# Patient Record
Sex: MALE | Race: WHITE | NOT HISPANIC OR LATINO | Employment: UNEMPLOYED | URBAN - METROPOLITAN AREA
[De-identification: names, ages, dates, MRNs, and addresses within clinical notes are randomized per-mention and may not be internally consistent; named-entity substitution may affect disease eponyms.]

---

## 2022-05-12 ENCOUNTER — HOSPITAL ENCOUNTER (INPATIENT)
Facility: HOSPITAL | Age: 67
LOS: 1 days | Discharge: HOME | DRG: 280 | End: 2022-05-13
Attending: EMERGENCY MEDICINE | Admitting: INTERNAL MEDICINE
Payer: COMMERCIAL

## 2022-05-12 ENCOUNTER — APPOINTMENT (EMERGENCY)
Dept: RADIOLOGY | Facility: HOSPITAL | Age: 67
DRG: 280 | End: 2022-05-12
Payer: COMMERCIAL

## 2022-05-12 DIAGNOSIS — N17.9 AKI (ACUTE KIDNEY INJURY) (HCC): ICD-10-CM

## 2022-05-12 DIAGNOSIS — I48.91 ATRIAL FIBRILLATION WITH RVR (HCC): ICD-10-CM

## 2022-05-12 DIAGNOSIS — I50.9 ACUTE ON CHRONIC CONGESTIVE HEART FAILURE, UNSPECIFIED HEART FAILURE TYPE (HCC): ICD-10-CM

## 2022-05-12 DIAGNOSIS — I48.91 ATRIAL FIBRILLATION WITH RAPID VENTRICULAR RESPONSE (HCC): Primary | ICD-10-CM

## 2022-05-12 PROBLEM — I10 HTN (HYPERTENSION): Status: ACTIVE | Noted: 2022-05-12

## 2022-05-12 PROBLEM — R79.1 ELEVATED INR: Status: ACTIVE | Noted: 2022-05-12

## 2022-05-12 LAB
2HR DELTA HS TROPONIN: 56 NG/L
4HR DELTA HS TROPONIN: 119 NG/L
ALBUMIN SERPL BCP-MCNC: 3.6 G/DL (ref 3.5–5)
ALP SERPL-CCNC: 107 U/L (ref 46–116)
ALT SERPL W P-5'-P-CCNC: 32 U/L (ref 12–78)
ANION GAP SERPL CALCULATED.3IONS-SCNC: 15 MMOL/L (ref 4–13)
APTT PPP: 55 SECONDS (ref 23–37)
AST SERPL W P-5'-P-CCNC: 27 U/L (ref 5–45)
BACTERIA UR QL AUTO: ABNORMAL /HPF
BASOPHILS # BLD AUTO: 0.05 THOUSANDS/ΜL (ref 0–0.1)
BASOPHILS NFR BLD AUTO: 0 % (ref 0–1)
BILIRUB SERPL-MCNC: 0.69 MG/DL (ref 0.2–1)
BILIRUB UR QL STRIP: ABNORMAL
BUN SERPL-MCNC: 18 MG/DL (ref 5–25)
CALCIUM SERPL-MCNC: 9.1 MG/DL (ref 8.3–10.1)
CARDIAC TROPONIN I PNL SERPL HS: 216 NG/L
CARDIAC TROPONIN I PNL SERPL HS: 272 NG/L
CARDIAC TROPONIN I PNL SERPL HS: 335 NG/L
CHLORIDE SERPL-SCNC: 99 MMOL/L (ref 100–108)
CLARITY UR: CLEAR
CO2 SERPL-SCNC: 26 MMOL/L (ref 21–32)
COLOR UR: YELLOW
CREAT SERPL-MCNC: 1.7 MG/DL (ref 0.6–1.3)
EOSINOPHIL # BLD AUTO: 0.01 THOUSAND/ΜL (ref 0–0.61)
EOSINOPHIL NFR BLD AUTO: 0 % (ref 0–6)
ERYTHROCYTE [DISTWIDTH] IN BLOOD BY AUTOMATED COUNT: 14.7 % (ref 11.6–15.1)
FLUAV RNA RESP QL NAA+PROBE: NEGATIVE
FLUBV RNA RESP QL NAA+PROBE: NEGATIVE
GFR SERPL CREATININE-BSD FRML MDRD: 40 ML/MIN/1.73SQ M
GLUCOSE SERPL-MCNC: 191 MG/DL (ref 65–140)
GLUCOSE UR STRIP-MCNC: NEGATIVE MG/DL
HCT VFR BLD AUTO: 48.5 % (ref 36.5–49.3)
HGB BLD-MCNC: 15.7 G/DL (ref 12–17)
HGB UR QL STRIP.AUTO: ABNORMAL
HYALINE CASTS #/AREA URNS LPF: ABNORMAL /LPF
IMM GRANULOCYTES # BLD AUTO: 0.12 THOUSAND/UL (ref 0–0.2)
IMM GRANULOCYTES NFR BLD AUTO: 1 % (ref 0–2)
INR PPP: 6.3 (ref 0.84–1.19)
KETONES UR STRIP-MCNC: ABNORMAL MG/DL
LEUKOCYTE ESTERASE UR QL STRIP: NEGATIVE
LYMPHOCYTES # BLD AUTO: 1.55 THOUSANDS/ΜL (ref 0.6–4.47)
LYMPHOCYTES NFR BLD AUTO: 10 % (ref 14–44)
MAGNESIUM SERPL-MCNC: 1.6 MG/DL (ref 1.6–2.6)
MCH RBC QN AUTO: 29.9 PG (ref 26.8–34.3)
MCHC RBC AUTO-ENTMCNC: 32.4 G/DL (ref 31.4–37.4)
MCV RBC AUTO: 92 FL (ref 82–98)
MONOCYTES # BLD AUTO: 0.53 THOUSAND/ΜL (ref 0.17–1.22)
MONOCYTES NFR BLD AUTO: 3 % (ref 4–12)
MUCOUS THREADS UR QL AUTO: ABNORMAL
NEUTROPHILS # BLD AUTO: 13.26 THOUSANDS/ΜL (ref 1.85–7.62)
NEUTS SEG NFR BLD AUTO: 86 % (ref 43–75)
NITRITE UR QL STRIP: NEGATIVE
NON-SQ EPI CELLS URNS QL MICRO: ABNORMAL /HPF
NRBC BLD AUTO-RTO: 1 /100 WBCS
OTHER STN SPEC: ABNORMAL
PH UR STRIP.AUTO: 5.5 [PH]
PLATELET # BLD AUTO: 558 THOUSANDS/UL (ref 149–390)
PMV BLD AUTO: 11.2 FL (ref 8.9–12.7)
POTASSIUM SERPL-SCNC: 4.5 MMOL/L (ref 3.5–5.3)
PROT SERPL-MCNC: 7.6 G/DL (ref 6.4–8.2)
PROT UR STRIP-MCNC: ABNORMAL MG/DL
PROTHROMBIN TIME: 53 SECONDS (ref 11.6–14.5)
RBC # BLD AUTO: 5.25 MILLION/UL (ref 3.88–5.62)
RBC #/AREA URNS AUTO: ABNORMAL /HPF
RSV RNA RESP QL NAA+PROBE: NEGATIVE
SARS-COV-2 RNA RESP QL NAA+PROBE: NEGATIVE
SODIUM SERPL-SCNC: 140 MMOL/L (ref 136–145)
SP GR UR STRIP.AUTO: >=1.03 (ref 1–1.03)
UROBILINOGEN UR QL STRIP.AUTO: 0.2 E.U./DL
WBC # BLD AUTO: 15.52 THOUSAND/UL (ref 4.31–10.16)
WBC #/AREA URNS AUTO: ABNORMAL /HPF

## 2022-05-12 PROCEDURE — 84484 ASSAY OF TROPONIN QUANT: CPT | Performed by: EMERGENCY MEDICINE

## 2022-05-12 PROCEDURE — 71250 CT THORAX DX C-: CPT

## 2022-05-12 PROCEDURE — 99223 1ST HOSP IP/OBS HIGH 75: CPT

## 2022-05-12 PROCEDURE — 0241U HB NFCT DS VIR RESP RNA 4 TRGT: CPT

## 2022-05-12 PROCEDURE — 96361 HYDRATE IV INFUSION ADD-ON: CPT

## 2022-05-12 PROCEDURE — 71045 X-RAY EXAM CHEST 1 VIEW: CPT

## 2022-05-12 PROCEDURE — 96376 TX/PRO/DX INJ SAME DRUG ADON: CPT

## 2022-05-12 PROCEDURE — 96365 THER/PROPH/DIAG IV INF INIT: CPT

## 2022-05-12 PROCEDURE — 36415 COLL VENOUS BLD VENIPUNCTURE: CPT | Performed by: EMERGENCY MEDICINE

## 2022-05-12 PROCEDURE — 85610 PROTHROMBIN TIME: CPT | Performed by: EMERGENCY MEDICINE

## 2022-05-12 PROCEDURE — 85730 THROMBOPLASTIN TIME PARTIAL: CPT | Performed by: EMERGENCY MEDICINE

## 2022-05-12 PROCEDURE — 96375 TX/PRO/DX INJ NEW DRUG ADDON: CPT

## 2022-05-12 PROCEDURE — 74176 CT ABD & PELVIS W/O CONTRAST: CPT

## 2022-05-12 PROCEDURE — 99291 CRITICAL CARE FIRST HOUR: CPT | Performed by: EMERGENCY MEDICINE

## 2022-05-12 PROCEDURE — 99285 EMERGENCY DEPT VISIT HI MDM: CPT

## 2022-05-12 PROCEDURE — 83735 ASSAY OF MAGNESIUM: CPT | Performed by: EMERGENCY MEDICINE

## 2022-05-12 PROCEDURE — 96366 THER/PROPH/DIAG IV INF ADDON: CPT

## 2022-05-12 PROCEDURE — 80053 COMPREHEN METABOLIC PANEL: CPT | Performed by: EMERGENCY MEDICINE

## 2022-05-12 PROCEDURE — 81001 URINALYSIS AUTO W/SCOPE: CPT

## 2022-05-12 PROCEDURE — G1004 CDSM NDSC: HCPCS

## 2022-05-12 PROCEDURE — 85025 COMPLETE CBC W/AUTO DIFF WBC: CPT | Performed by: EMERGENCY MEDICINE

## 2022-05-12 RX ORDER — WARFARIN SODIUM 2.5 MG/1
2.5 TABLET ORAL
COMMUNITY
End: 2022-05-13

## 2022-05-12 RX ORDER — ONDANSETRON 2 MG/ML
4 INJECTION INTRAMUSCULAR; INTRAVENOUS EVERY 6 HOURS PRN
Status: DISCONTINUED | OUTPATIENT
Start: 2022-05-12 | End: 2022-05-13 | Stop reason: HOSPADM

## 2022-05-12 RX ORDER — LISINOPRIL 5 MG/1
5 TABLET ORAL DAILY
COMMUNITY

## 2022-05-12 RX ORDER — ASPIRIN 81 MG/1
324 TABLET, CHEWABLE ORAL ONCE
Status: DISCONTINUED | OUTPATIENT
Start: 2022-05-12 | End: 2022-05-13 | Stop reason: HOSPADM

## 2022-05-12 RX ORDER — METOPROLOL TARTRATE 100 MG/1
100 TABLET ORAL EVERY 12 HOURS SCHEDULED
COMMUNITY

## 2022-05-12 RX ORDER — MAGNESIUM SULFATE HEPTAHYDRATE 40 MG/ML
2 INJECTION, SOLUTION INTRAVENOUS ONCE
Status: COMPLETED | OUTPATIENT
Start: 2022-05-12 | End: 2022-05-13

## 2022-05-12 RX ORDER — DILTIAZEM HYDROCHLORIDE 5 MG/ML
20 INJECTION INTRAVENOUS ONCE
Status: COMPLETED | OUTPATIENT
Start: 2022-05-12 | End: 2022-05-12

## 2022-05-12 RX ORDER — FUROSEMIDE 20 MG/1
20 TABLET ORAL 2 TIMES DAILY
Status: ON HOLD | COMMUNITY
End: 2022-05-13 | Stop reason: SDUPTHER

## 2022-05-12 RX ORDER — DIGOXIN 125 MCG
125 TABLET ORAL DAILY
Status: ON HOLD | COMMUNITY
End: 2022-05-13 | Stop reason: SDUPTHER

## 2022-05-12 RX ORDER — FUROSEMIDE 10 MG/ML
40 INJECTION INTRAMUSCULAR; INTRAVENOUS DAILY
Status: DISCONTINUED | OUTPATIENT
Start: 2022-05-12 | End: 2022-05-13

## 2022-05-12 RX ORDER — ACETAMINOPHEN 325 MG/1
650 TABLET ORAL EVERY 6 HOURS PRN
Status: DISCONTINUED | OUTPATIENT
Start: 2022-05-12 | End: 2022-05-13 | Stop reason: HOSPADM

## 2022-05-12 RX ORDER — ONDANSETRON 2 MG/ML
4 INJECTION INTRAMUSCULAR; INTRAVENOUS ONCE
Status: COMPLETED | OUTPATIENT
Start: 2022-05-12 | End: 2022-05-12

## 2022-05-12 RX ADMIN — SODIUM CHLORIDE 1000 ML: 0.9 INJECTION, SOLUTION INTRAVENOUS at 17:24

## 2022-05-12 RX ADMIN — DILTIAZEM HYDROCHLORIDE 20 MG: 5 INJECTION INTRAVENOUS at 17:24

## 2022-05-12 RX ADMIN — ONDANSETRON 4 MG: 2 INJECTION INTRAMUSCULAR; INTRAVENOUS at 21:17

## 2022-05-12 RX ADMIN — DILTIAZEM HYDROCHLORIDE 5 MG/HR: 5 INJECTION, SOLUTION INTRAVENOUS at 17:58

## 2022-05-12 RX ADMIN — ONDANSETRON 4 MG: 2 INJECTION INTRAMUSCULAR; INTRAVENOUS at 17:24

## 2022-05-12 RX ADMIN — MAGNESIUM SULFATE HEPTAHYDRATE 2 G: 40 INJECTION, SOLUTION INTRAVENOUS at 22:51

## 2022-05-12 RX ADMIN — FUROSEMIDE 40 MG: 10 INJECTION, SOLUTION INTRAMUSCULAR; INTRAVENOUS at 22:51

## 2022-05-12 NOTE — ED PROCEDURE NOTE
PROCEDURE  ECG 12 Lead Documentation Only    Date/Time: 5/12/2022 6:16 PM  Performed by: Lara Mcclellan DO  Authorized by: Lara Mcclellan DO     ECG reviewed by me, the ED Provider: yes    Patient location:  ED  Interpretation:     Interpretation: abnormal    Quality:     Tracing quality:  Limited by artifact  Rate:     ECG rate:  136    ECG rate assessment: tachycardic    Rhythm:     Rhythm: atrial fibrillation    Ectopy:     Ectopy: none    ST segments:     ST segments:  Normal  T waves:     T waves: normal    Q waves:     Q waves:  III         Lara Mcclellan DO  05/12/22 1816

## 2022-05-13 ENCOUNTER — APPOINTMENT (INPATIENT)
Dept: NON INVASIVE DIAGNOSTICS | Facility: HOSPITAL | Age: 67
DRG: 280 | End: 2022-05-13
Payer: COMMERCIAL

## 2022-05-13 VITALS
OXYGEN SATURATION: 93 % | DIASTOLIC BLOOD PRESSURE: 85 MMHG | SYSTOLIC BLOOD PRESSURE: 129 MMHG | HEART RATE: 75 BPM | HEIGHT: 68 IN | RESPIRATION RATE: 18 BRPM | WEIGHT: 174 LBS | TEMPERATURE: 99.2 F | BODY MASS INDEX: 26.37 KG/M2

## 2022-05-13 PROBLEM — R77.8 ELEVATED TROPONIN: Status: ACTIVE | Noted: 2022-05-13

## 2022-05-13 PROBLEM — D72.829 LEUKOCYTOSIS: Status: ACTIVE | Noted: 2022-05-13

## 2022-05-13 LAB
ANION GAP SERPL CALCULATED.3IONS-SCNC: 11 MMOL/L (ref 4–13)
AORTIC ROOT: 3.4 CM
APICAL FOUR CHAMBER EJECTION FRACTION: 35 %
APTT PPP: 51 SECONDS (ref 23–37)
BASOPHILS # BLD AUTO: 0.07 THOUSANDS/ΜL (ref 0–0.1)
BASOPHILS NFR BLD AUTO: 0 % (ref 0–1)
BUN SERPL-MCNC: 22 MG/DL (ref 5–25)
CALCIUM SERPL-MCNC: 8.3 MG/DL (ref 8.3–10.1)
CARDIAC TROPONIN I PNL SERPL HS: 299 NG/L (ref 8–18)
CHLORIDE SERPL-SCNC: 99 MMOL/L (ref 100–108)
CO2 SERPL-SCNC: 27 MMOL/L (ref 21–32)
CREAT SERPL-MCNC: 1.58 MG/DL (ref 0.6–1.3)
CREAT UR-MCNC: 94 MG/DL
DIGOXIN SERPL-MCNC: 1.2 NG/ML (ref 0.8–2)
EOSINOPHIL # BLD AUTO: 0.01 THOUSAND/ΜL (ref 0–0.61)
EOSINOPHIL NFR BLD AUTO: 0 % (ref 0–6)
ERYTHROCYTE [DISTWIDTH] IN BLOOD BY AUTOMATED COUNT: 14.8 % (ref 11.6–15.1)
FRACTIONAL SHORTENING: 22 (ref 28–44)
GFR SERPL CREATININE-BSD FRML MDRD: 44 ML/MIN/1.73SQ M
GLUCOSE SERPL-MCNC: 116 MG/DL (ref 65–140)
HCT VFR BLD AUTO: 40.8 % (ref 36.5–49.3)
HGB BLD-MCNC: 13.4 G/DL (ref 12–17)
IMM GRANULOCYTES # BLD AUTO: 0.05 THOUSAND/UL (ref 0–0.2)
IMM GRANULOCYTES NFR BLD AUTO: 0 % (ref 0–2)
INR PPP: 4.74 (ref 0.84–1.19)
INTERVENTRICULAR SEPTUM IN DIASTOLE (PARASTERNAL SHORT AXIS VIEW): 1.1 CM
INTERVENTRICULAR SEPTUM: 1.1 CM (ref 0.6–1.1)
LAAS-AP2: 16.4 CM2
LAAS-AP4: 18.4 CM2
LEFT ATRIUM SIZE: 4 CM
LEFT INTERNAL DIMENSION IN SYSTOLE: 3.9 CM (ref 2.1–4)
LEFT VENTRICLE DIASTOLIC VOLUME (MOD BIPLANE): 83 ML
LEFT VENTRICLE SYSTOLIC VOLUME (MOD BIPLANE): 43 ML
LEFT VENTRICULAR INTERNAL DIMENSION IN DIASTOLE: 5 CM (ref 3.5–6)
LEFT VENTRICULAR POSTERIOR WALL IN END DIASTOLE: 1.2 CM
LEFT VENTRICULAR STROKE VOLUME: 53 ML
LV EF: 48 %
LVSV (TEICH): 53 ML
LYMPHOCYTES # BLD AUTO: 3.11 THOUSANDS/ΜL (ref 0.6–4.47)
LYMPHOCYTES NFR BLD AUTO: 20 % (ref 14–44)
MCH RBC QN AUTO: 30.5 PG (ref 26.8–34.3)
MCHC RBC AUTO-ENTMCNC: 32.8 G/DL (ref 31.4–37.4)
MCV RBC AUTO: 93 FL (ref 82–98)
MONOCYTES # BLD AUTO: 1.33 THOUSAND/ΜL (ref 0.17–1.22)
MONOCYTES NFR BLD AUTO: 8 % (ref 4–12)
MV E'TISSUE VEL-SEP: 5 CM/S
MV STENOSIS PRESSURE HALF TIME: 42 MS
MV VALVE AREA P 1/2 METHOD: 5.24
NEUTROPHILS # BLD AUTO: 11.25 THOUSANDS/ΜL (ref 1.85–7.62)
NEUTS SEG NFR BLD AUTO: 72 % (ref 43–75)
NRBC BLD AUTO-RTO: 0 /100 WBCS
PLATELET # BLD AUTO: 413 THOUSANDS/UL (ref 149–390)
PMV BLD AUTO: 10.6 FL (ref 8.9–12.7)
POTASSIUM SERPL-SCNC: 4.2 MMOL/L (ref 3.5–5.3)
PROT UR-MCNC: 17 MG/DL
PROT/CREAT UR: 0.18 MG/G{CREAT} (ref 0–0.1)
PROTHROMBIN TIME: 42.7 SECONDS (ref 11.6–14.5)
RA PRESSURE ESTIMATED: 8 MMHG
RBC # BLD AUTO: 4.39 MILLION/UL (ref 3.88–5.62)
RIGHT ATRIUM AREA SYSTOLE A4C: 17.7 CM2
RIGHT VENTRICLE ID DIMENSION: 3.1 CM
RV PSP: 43 MMHG
SL CV LEFT ATRIUM LENGTH A2C: 4.7 CM
SL CV LV EF: 45
SL CV PED ECHO LEFT VENTRICLE DIASTOLIC VOLUME (MOD BIPLANE) 2D: 118 ML
SL CV PED ECHO LEFT VENTRICLE SYSTOLIC VOLUME (MOD BIPLANE) 2D: 64 ML
SODIUM SERPL-SCNC: 137 MMOL/L (ref 136–145)
TR MAX PG: 35 MMHG
TR PEAK VELOCITY: 3 M/S
TRICUSPID VALVE PEAK REGURGITATION VELOCITY: 2.95 M/S
TSH SERPL DL<=0.05 MIU/L-ACNC: 3.72 UIU/ML (ref 0.45–4.5)
WBC # BLD AUTO: 15.82 THOUSAND/UL (ref 4.31–10.16)

## 2022-05-13 PROCEDURE — 80162 ASSAY OF DIGOXIN TOTAL: CPT | Performed by: NURSE PRACTITIONER

## 2022-05-13 PROCEDURE — 93306 TTE W/DOPPLER COMPLETE: CPT | Performed by: INTERNAL MEDICINE

## 2022-05-13 PROCEDURE — 84156 ASSAY OF PROTEIN URINE: CPT | Performed by: NURSE PRACTITIONER

## 2022-05-13 PROCEDURE — 99239 HOSP IP/OBS DSCHRG MGMT >30: CPT | Performed by: INTERNAL MEDICINE

## 2022-05-13 PROCEDURE — 84443 ASSAY THYROID STIM HORMONE: CPT | Performed by: NURSE PRACTITIONER

## 2022-05-13 PROCEDURE — 80048 BASIC METABOLIC PNL TOTAL CA: CPT

## 2022-05-13 PROCEDURE — 85025 COMPLETE CBC W/AUTO DIFF WBC: CPT

## 2022-05-13 PROCEDURE — 85610 PROTHROMBIN TIME: CPT

## 2022-05-13 PROCEDURE — 85730 THROMBOPLASTIN TIME PARTIAL: CPT

## 2022-05-13 PROCEDURE — 82570 ASSAY OF URINE CREATININE: CPT | Performed by: NURSE PRACTITIONER

## 2022-05-13 PROCEDURE — 93306 TTE W/DOPPLER COMPLETE: CPT

## 2022-05-13 PROCEDURE — 99222 1ST HOSP IP/OBS MODERATE 55: CPT | Performed by: NURSE PRACTITIONER

## 2022-05-13 PROCEDURE — 84484 ASSAY OF TROPONIN QUANT: CPT | Performed by: NURSE PRACTITIONER

## 2022-05-13 RX ORDER — DIGOXIN 125 MCG
125 TABLET ORAL EVERY OTHER DAY
Refills: 0
Start: 2022-05-13

## 2022-05-13 RX ORDER — METOPROLOL TARTRATE 100 MG/1
100 TABLET ORAL EVERY 12 HOURS SCHEDULED
Status: DISCONTINUED | OUTPATIENT
Start: 2022-05-13 | End: 2022-05-13 | Stop reason: HOSPADM

## 2022-05-13 RX ORDER — FUROSEMIDE 20 MG/1
20 TABLET ORAL DAILY
Refills: 0
Start: 2022-05-13

## 2022-05-13 RX ADMIN — DILTIAZEM HYDROCHLORIDE 7.5 MG/HR: 5 INJECTION, SOLUTION INTRAVENOUS at 00:04

## 2022-05-13 RX ADMIN — FUROSEMIDE 40 MG: 10 INJECTION, SOLUTION INTRAMUSCULAR; INTRAVENOUS at 08:20

## 2022-05-13 RX ADMIN — METOPROLOL TARTRATE 100 MG: 100 TABLET, FILM COATED ORAL at 09:07

## 2022-05-13 NOTE — ASSESSMENT & PLAN NOTE
Troponin peaked at 355  Likely type 2 myocardial infarction in the setting of atrial fibrillation with rapid ventricular rate  Patient had  prior cardiac catheterization which showed no CAD

## 2022-05-13 NOTE — ASSESSMENT & PLAN NOTE
Pt INR noted to be 6 3 on warfarin at home  · Continue to hold Coumadin  · INR improved to 4 7  · Hold Coumadin for the next 2 days with repeat PT/INR on May 16, 2022

## 2022-05-13 NOTE — DISCHARGE SUMMARY
[facility]  Discharge- Aye Mcgill 1955, 74 y o  male MRN: [id number]  Unit/Bed#: [address] [place] [address] Encounter: [id number]  Primary Care Provider: No primary care provider on file  Date and time admitted to hospital: 2022  4:50 PM    * Atrial fibrillation with RVR [facility])  Assessment & Plan  Patient began with racing heart, vomiting beginning today  · Dx January at [place], on warfarin (unsure of dose, unable to verify)  · Per patient found to have thrombus on NATHALY, cardiac cath without extensive stenosis  · Nursing to obtain records  · Patient was given 20 milligram Cardizem bolus and was started on Cardizem drip and heart rate has improved  · Cardizem drip was discontinued   · Continue Lopressor 100 milligram p o  B i d   · INR was supratherapeutic for which Coumadin has been on hold  · Patient has a follow-up appointment with EP next week to discuss NATHALY with cardioversion  · Patient was discharged home as patient has  to attend tomorrow  · 2D echo showed slightly reduced systolic function with EF of 45% with mild-to-moderate MR and mild TR    Elevated troponin  Assessment & Plan  Troponin peaked at 355  Likely type 2 myocardial infarction in the setting of atrial fibrillation with rapid ventricular rate  Patient had  prior cardiac catheterization which showed no CAD    Acute on chronic congestive heart failure ([address] )  Assessment & Plan  Wt Readings from Last 3 Encounters:   22 78 9 kg (174 lb)   Pt with increasing SOB on 2L oxygen, orthopnea  · Patient on lasix 20mg at home, unable to verify dose with pharmacy  · CT scan shows bilateral symmetric perihilar groundglass opacities and small bilateral effusions likely on the basis of pulmonary edema/CHF  Patient was given Lasix 40 milligrams  IV during hospitalization  No clinical evidence of significant fluid overload as patient is able to lay flat without any symptoms  Lasix dose was decreased to 20 milligram p o   Daily as per Cardiology recommendations          Leukocytosis  Assessment & Plan  No obvious signs of infection  Follow-up CBC in 1 week    KEILA (acute kidney injury) ([facility] [address] )  Assessment & Plan  Creatinine elevated to 1 7, unknown baseline  Creatinine improved  Resume lisinopril upon discharge      HTN (hypertension)  Assessment & Plan  Resume lisinopril    Elevated INR  Assessment & Plan  Pt INR noted to be 6 3 on warfarin at home  · Continue to hold Coumadin  · INR improved to 4 7  · Hold Coumadin for the next 2 days with repeat PT/INR on May 16, 2022      Medical Problems             Resolved Problems  Date Reviewed: 5/13/2022   None               Discharging Physician / Practitioner: Daryl Gomez MD  PCP: No primary care provider on file  Admission Date:   Admission Orders (From admission, onward)     Ordered        05/12/22 1940  INPATIENT ADMISSION  Once                      Discharge Date: 05/13/22    Consultations During Hospital Stay:  · Cardiology     Outpatient Tests Requested:  · Follow-up with Cardiology  CBC with differential and BMP in 1 week  PT/INR in 2 days    Complications:  None    Reason for Admission:  Racing heart    Hospital Course:   Magali Clemons is a 74 y o  male patient with past medical of CHF, atrial fibrillation, hypertension who originally presented to the hospital on 5/12/2022 due to in heart and vomiting  In the ED patient noted to be in atrial fibrillation with rapid ventricular rate and patient was started on Cardizem drip after receiving a bolus  Patient heart rate improved significantly and later Cardizem drip was discontinued  Patient also noted to have supratherapeutic INR for which Coumadin was on hold  Patient's troponin did peak at 355  But patient remained asymptomatic  Patient was seen by Cardiology and remained stable  Patient was transitioned back to metoprolol    Patient advised to hold Coumadin and advised to follow up outpatient with primary Cardiology and EP       Please see above list of diagnoses and related plan for additional information  Condition at Discharge: stable    Discharge Day Visit / Exam:   Subjective:  Patient denies any chest pain, shortness a breath, further palpitations, headache or dizziness  Vitals: Blood Pressure: 129/85 (05/13/22 1145)  Pulse: 75 (05/13/22 1145)  Temperature: 99 2 °F (37 3 °C) (05/13/22 1145)  Temp Source: Tympanic (05/12/22 1637)  Respirations: 18 (05/12/22 2358)  Height: 5' 8" (172 7 cm) (05/13/22 0747)  Weight - Scale: 78 9 kg (174 lb) (05/13/22 0747)  SpO2: 93 % (05/13/22 1145)  Exam:   Physical Exam  Constitutional:       Appearance: Normal appearance  HENT:      Head: Normocephalic and atraumatic  Eyes:      Extraocular Movements: Extraocular movements intact  Pupils: Pupils are equal, round, and reactive to light  Cardiovascular:      Rate and Rhythm: Normal rate  Rhythm irregular  Heart sounds: No murmur heard  No gallop  Pulmonary:      Effort: Pulmonary effort is normal       Breath sounds: Normal breath sounds  Abdominal:      General: Bowel sounds are normal       Palpations: Abdomen is soft  Tenderness: There is no abdominal tenderness  Musculoskeletal:         General: No swelling or deformity  Normal range of motion  Cervical back: Normal range of motion and neck supple  Skin:     General: Skin is warm and dry  Neurological:      General: No focal deficit present  Mental Status: He is alert  Discussion with Family: yes    Discharge instructions/Information to patient and family:   See after visit summary for information provided to patient and family  Provisions for Follow-Up Care:  See after visit summary for information related to follow-up care and any pertinent home health orders  Disposition:   Home    Planned Readmission: No     Discharge Statement:  I spent 35 minutes discharging the patient  This time was spent on the day of discharge   I had direct contact with the patient on the day of discharge  Greater than 50% of the total time was spent examining patient, answering all patient questions, arranging and discussing plan of care with patient as well as directly providing post-discharge instructions  Additional time then spent on discharge activities  Discharge Medications:  See after visit summary for reconciled discharge medications provided to patient and/or family        **Please Note: This note may have been constructed using a voice recognition system**

## 2022-05-13 NOTE — H&P
[facility]  H&P- Heather Bradshaw 1955, 74 y o  male MRN: [id number]  Unit/Bed#: [address] [place] [address] Encounter: [id number]  Primary Care Provider: No primary care provider on file  Date and time admitted to hospital: 5/12/2022  4:50 PM    * Atrial fibrillation with RVR [facility])  Assessment & Plan  Patient began with racing heart, vomiting beginning today  · Dx January at [place], on warfarin (unsure of dose, unable to verify)  · Per patient found to have thrombus on NATHALY, cardiac cath without extensive stenosis  · Nursing to obtain records  · Give cardizem 20mg bolus, cardizem drip  · Continue cardizem drip 12 5mg  · Optimize electrolytes  · Monitor on tele  · Cardiology consult    Acute on chronic congestive heart failure ([facility] )  Assessment & Plan  Wt Readings from Last 3 Encounters:   05/12/22 77 1 kg (170 lb)   Pt with increasing SOB on 2L oxygen, orthopnea  · Patient on lasix 20mg at home, unable to verify dose with pharmacy  · CT scan shows bilateral symmetric perihilar groundglass opacities and small bilateral effusions likely on the basis of pulmonary edema/CHF  · Start lasix 40mg IV daily  Daily weights, I/Os  Fluid, sodium restriction          KEILA (acute kidney injury) ([facility] [address] )  Assessment & Plan  Creatinine elevated to 1 7, unknown baseline  Avoid nephrotoxins, hold lisinopril  Monitor with BMP      HTN (hypertension)  Assessment & Plan  Hold lisinopril due to elevated creatinine    Elevated INR  Assessment & Plan  Pt INR noted to be 6 3 on warfarin at home  · Hold warfarin  · No signs of active bleeding  · Recheck INR/PTT in AM    VTE Pharmacologic Prophylaxis: VTE Score: 4 Moderate Risk (Score 3-4) - Pharmacological DVT Prophylaxis Contraindicated  Sequential Compression Devices Ordered  Code Status: Full code  Discussion with family: Patient declined call to        Anticipated Length of Stay: Patient will be admitted on an inpatient basis with an anticipated length of stay of greater than 2 midnights secondary to a fib RVR  Total Time for Visit, including Counseling / Coordination of Care: 45 minutes Greater than 50% of this total time spent on direct patient counseling and coordination of care  Chief Complaint: heart racing    History of Present Illness:  Zunilda Bardales is a 74 y o  male with a PMH of CHF, a fib, HTN who presents with a fib with RVR  He states that he was admitted to [facility] in January with COVID and found to be in a fib with RVR  He states he required ICU level care and has an extensive workup done  Per patient he had a NATHALY done which showed 2 thrombus in his heart and therefore couldn't get cardioverted  He states he was having chest pain at that time and had a cardiac cath done that showed no significant occlusion  He was started on digoxin, metoprolol, lisinopril, furosemide  Unable to verify dosages with pharmacy  He now presents with feeling like his heart is racing and vomiting beginning today  He denies any chest pain currently  He also notes non bloody vomiting beginning today  Also has been more SOB over the last few days and sleeps on an incline  Denies any bruising, nose bleeds, melena  Denies fevers, chills, cough, abdominal pain, constipation, diarrhea, dysuria, hematuria  Pt has also been feeling down recently as grandson recently passed away  He would like to attend  on Saturday if possible  Review of Systems:  Review of Systems   Constitutional: Negative for chills and fever  HENT: Negative  Eyes: Negative  Respiratory: Positive for shortness of breath  Cardiovascular: Positive for palpitations  Negative for chest pain  Gastrointestinal: Positive for vomiting  Negative for abdominal pain, constipation and diarrhea  Endocrine: Negative  Genitourinary: Negative for dysuria, hematuria and urgency  Musculoskeletal: Negative  Allergic/Immunologic: Negative      Neurological: Negative for dizziness, light-headedness and numbness  Hematological: Negative  Psychiatric/Behavioral: Negative  Past Medical and Surgical History:   Past Medical History:   Diagnosis Date    Atrial fibrillation ([address] )     Hypertension        Past Surgical History:   Procedure Laterality Date    JOINT REPLACEMENT Right     knee       Meds/Allergies:  Prior to Admission medications    Medication Sig Start Date End Date Taking? Authorizing Provider   digoxin (Digox) 0 125 mg tablet Take 125 mcg by mouth in the morning  Yes Historical Provider, MD   furosemide (LASIX) 20 mg tablet Take 20 mg by mouth in the morning and 20 mg in the evening  Yes Historical Provider, MD   lisinopril (ZESTRIL) 5 mg tablet Take 5 mg by mouth in the morning  Yes Historical Provider, MD   metoprolol tartrate (LOPRESSOR) 100 mg tablet Take 100 mg by mouth every 12 (twelve) hours   Yes Historical Provider, MD   warfarin (COUMADIN) 2 5 mg tablet Take 2 5 mg by mouth in the evening  *Pt unsure of dose*  Yes Historical Provider, MD     Reviewed home medications with patient, however unable to verify with pharamcy  Allergies: Allergies   Allergen Reactions    Codeine Rash       Social History:  Marital Status: Single   Occupation:   Patient Pre-hospital Living Situation: Home  Patient Pre-hospital Level of Mobility: walks  Patient Pre-hospital Diet Restrictions:   Substance Use History:   Social History     Substance and Sexual Activity   Alcohol Use Yes     Social History     Tobacco Use   Smoking Status Former Smoker   Smokeless Tobacco Not on file     Social History     Substance and Sexual Activity   Drug Use Never       Family History:  History reviewed  No pertinent family history      Physical Exam:     Vitals:   Blood Pressure: 161/93 (05/12/22 2100)  Pulse: 90 (05/12/22 2100)  Temperature: (!) 97 1 °F (36 2 °C) (05/12/22 1637)  Temp Source: Tympanic (05/12/22 1637)  Respirations: 20 (05/12/22 2100)  Height: 5' 8" (172 7 cm) (05/12/22 1759)  Weight - Scale: 77 1 kg (170 lb) (05/12/22 1759)  SpO2: 95 % (05/12/22 2100)    Physical Exam  Vitals reviewed  Constitutional:       General: He is not in acute distress  Appearance: He is well-developed  HENT:      Head: Normocephalic and atraumatic  Eyes:      Conjunctiva/sclera: Conjunctivae normal    Cardiovascular:      Rate and Rhythm: Tachycardia present  Rhythm irregular  Heart sounds: No murmur heard  Pulmonary:      Effort: Pulmonary effort is normal  No respiratory distress  Breath sounds: Rales present  Comments: 94% on 2L  Abdominal:      Palpations: Abdomen is soft  Tenderness: There is no abdominal tenderness  Skin:     General: Skin is warm and dry  Neurological:      Mental Status: He is alert  Additional Data:     Lab Results:  Results from last 7 days   Lab Units 05/12/22  1723   WBC Thousand/uL 15 52*   HEMOGLOBIN g/dL 15 7   HEMATOCRIT % 48 5   PLATELETS Thousands/uL 558*   NEUTROS PCT % 86*   LYMPHS PCT % 10*   MONOS PCT % 3*   EOS PCT % 0     Results from last 7 days   Lab Units 05/12/22  1723   SODIUM mmol/L 140   POTASSIUM mmol/L 4 5   CHLORIDE mmol/L 99*   CO2 mmol/L 26   BUN mg/dL 18   CREATININE mg/dL 1 70*   ANION GAP mmol/L 15*   CALCIUM mg/dL 9 1   ALBUMIN g/dL 3 6   TOTAL BILIRUBIN mg/dL 0 69   ALK PHOS U/L 107   ALT U/L 32   AST U/L 27   GLUCOSE RANDOM mg/dL 191*     Results from last 7 days   Lab Units 05/12/22  1808   INR  6 30*                   Imaging: Reviewed radiology reports from this admission including: abdominal/pelvic CT  CT chest abdomen pelvis wo contrast   Final Result by Faraz Lanza MD (05/12 1906)      Bilateral symmetric perihilar groundglass opacities and small bilateral effusions likely on the basis of pulmonary edema/CHF  No acute inflammatory process identified in the abdomen or pelvis  Colonic diverticulosis without diverticulitis                    Workstation performed: [id number]         XR chest 1 view portable    (Results Pending)       EKG and Other Studies Reviewed on Admission:   · EKG: Atrial fibrillation    ** Please Note: This note has been constructed using a voice recognition system   **

## 2022-05-13 NOTE — PLAN OF CARE
Problem: PAIN - ADULT  Goal: Verbalizes/displays adequate comfort level or baseline comfort level  Description: Interventions:  - Encourage patient to monitor pain and request assistance  - Assess pain using appropriate pain scale  - Administer analgesics based on type and severity of pain and evaluate response  - Implement non-pharmacological measures as appropriate and evaluate response  - Consider cultural and social influences on pain and pain management  - Notify physician/advanced practitioner if interventions unsuccessful or patient reports new pain  Outcome: Progressing     Problem: SAFETY ADULT  Goal: Patient will remain free of falls  Description: INTERVENTIONS:  - Educate patient/family on patient safety including physical limitations  - Instruct patient to call for assistance with activity   - Consult OT/PT to assist with strengthening/mobility   - Keep Call bell within reach  - Keep bed low and locked with side rails adjusted as appropriate  - Keep care items and personal belongings within reach  - Initiate and maintain comfort rounds  - Make Fall Risk Sign visible to staff  - Offer Toileting every 2 Hours, in advance of need  - Initiate/Maintain bed alarm    Problem: CARDIOVASCULAR - ADULT  Goal: Maintains optimal cardiac output and hemodynamic stability  Description: INTERVENTIONS:  - Monitor I/O, vital signs and rhythm  - Monitor for S/S and trends of decreased cardiac output  - Administer and titrate ordered vasoactive medications to optimize hemodynamic stability  - Assess quality of pulses, skin color and temperature  - Assess for signs of decreased coronary artery perfusion  - Instruct patient to report change in severity of symptoms  Outcome: Progressing  Goal: Absence of cardiac dysrhythmias or at baseline rhythm  Description: INTERVENTIONS:  - Continuous cardiac monitoring, vital signs, obtain 12 lead EKG if ordered  - Administer antiarrhythmic and heart rate control medications as ordered  - Monitor electrolytes and administer replacement therapy as ordered  Outcome: Progressing     Problem: RESPIRATORY - ADULT  Goal: Achieves optimal ventilation and oxygenation  Description: INTERVENTIONS:  - Assess for changes in respiratory status  - Assess for changes in mentation and behavior  - Position to facilitate oxygenation and minimize respiratory effort  - Oxygen administered by appropriate delivery if ordered  - Initiate smoking cessation education as indicated  - Encourage broncho-pulmonary hygiene including cough, deep breathe, Incentive Spirometry  - Assess the need for suctioning and aspirate as needed  - Assess and instruct to report SOB or any respiratory difficulty  - Respiratory Therapy support as indicated  Outcome: Progressing     - Apply yellow socks and bracelet for high fall risk patients  - Consider moving patient to room near nurses station  Outcome: Progressing

## 2022-05-13 NOTE — ASSESSMENT & PLAN NOTE
Wt Readings from Last 3 Encounters:   05/12/22 77 1 kg (170 lb)   Pt with increasing SOB on 2L oxygen, orthopnea  · Patient on lasix 20mg at home, unable to verify dose with pharmacy  · CT scan shows bilateral symmetric perihilar groundglass opacities and small bilateral effusions likely on the basis of pulmonary edema/CHF    · Start lasix 40mg IV daily  Daily weights, I/Os  Fluid, sodium restriction

## 2022-05-13 NOTE — PLAN OF CARE
Problem: PAIN - ADULT  Goal: Verbalizes/displays adequate comfort level or baseline comfort level  Description: Interventions:  - Encourage patient to monitor pain and request assistance  - Assess pain using appropriate pain scale  - Administer analgesics based on type and severity of pain and evaluate response  - Implement non-pharmacological measures as appropriate and evaluate response  - Consider cultural and social influences on pain and pain management  - Notify physician/advanced practitioner if interventions unsuccessful or patient reports new pain  5/13/2022 1348 by Michael Olivera RN  Outcome: Adequate for Discharge  5/13/2022 0943 by Michael Olivera RN  Outcome: Progressing     Problem: SAFETY ADULT  Goal: Patient will remain free of falls  Description: INTERVENTIONS:  - Educate patient/family on patient safety including physical limitations  - Instruct patient to call for assistance with activity   - Consult OT/PT to assist with strengthening/mobility   - Keep Call bell within reach  - Keep bed low and locked with side rails adjusted as appropriate  - Keep care items and personal belongings within reach  - Initiate and maintain comfort rounds  - Make Fall Risk Sign visible to staff  - Offer Toileting every 2 Hours, in advance of need  - Initiate/Maintain Bed alarm  - Obtain necessary fall risk management equipment: as required   - Apply yellow socks and bracelet for high fall risk patients  - Consider moving patient to room near nurses station  5/13/2022 1348 by Michael Olivera RN  Outcome: Adequate for Discharge  5/13/2022 0943 by Michael Olivera RN  Outcome: Progressing     Problem: CARDIOVASCULAR - ADULT  Goal: Maintains optimal cardiac output and hemodynamic stability  Description: INTERVENTIONS:  - Monitor I/O, vital signs and rhythm  - Monitor for S/S and trends of decreased cardiac output  - Administer and titrate ordered vasoactive medications to optimize hemodynamic stability  - Assess quality of pulses, skin color and temperature  - Assess for signs of decreased coronary artery perfusion  - Instruct patient to report change in severity of symptoms  5/13/2022 1348 by Yen Rosenthal RN  Outcome: Adequate for Discharge  5/13/2022 8886 by Yen Rosenthal RN  Outcome: Progressing  Goal: Absence of cardiac dysrhythmias or at baseline rhythm  Description: INTERVENTIONS:  - Continuous cardiac monitoring, vital signs, obtain 12 lead EKG if ordered  - Administer antiarrhythmic and heart rate control medications as ordered  - Monitor electrolytes and administer replacement therapy as ordered  5/13/2022 1348 by Yen Rosenthal RN  Outcome: Adequate for Discharge  5/13/2022 0943 by Yen Rosenthal RN  Outcome: Progressing     Problem: RESPIRATORY - ADULT  Goal: Achieves optimal ventilation and oxygenation  Description: INTERVENTIONS:  - Assess for changes in respiratory status  - Assess for changes in mentation and behavior  - Position to facilitate oxygenation and minimize respiratory effort  - Oxygen administered by appropriate delivery if ordered  - Initiate smoking cessation education as indicated  - Encourage broncho-pulmonary hygiene including cough, deep breathe, Incentive Spirometry  - Assess the need for suctioning and aspirate as needed  - Assess and instruct to report SOB or any respiratory difficulty  - Respiratory Therapy support as indicated  5/13/2022 1348 by Yen Rosenthal RN  Outcome: Adequate for Discharge  5/13/2022 0943 by Yen Rosenthal RN  Outcome: Progressing     Problem: Potential for Falls  Goal: Patient will remain free of falls  Description: INTERVENTIONS:  - Educate patient/family on patient safety including physical limitations  - Instruct patient to call for assistance with activity   - Consult OT/PT to assist with strengthening/mobility   - Keep Call bell within reach  - Keep bed low and locked with side rails adjusted as appropriate  - Keep care items and personal belongings within reach  - Initiate and maintain comfort rounds  - Make Fall Risk Sign visible to staff  - Offer Toileting every 2 Hours, in advance of need  - Initiate/Maintain Bed alarm  - Obtain necessary fall risk management equipment: as required   - Apply yellow socks and bracelet for high fall risk patients  - Consider moving patient to room near nurses station  5/13/2022 1348 by Autry Carrel, RN  Outcome: Adequate for Discharge  5/13/2022 0943 by Autry Carrel, RN  Outcome: Progressing

## 2022-05-13 NOTE — CASE MANAGEMENT
Case Management Assessment & Discharge Planning Note    Patient name Cabrera Sheridan  Location [address] [facility] 407/4 [place] 407-* MRN [id number]  : 1955 Date 2022       Current Admission Date: 2022  Current Admission Diagnosis:Atrial fibrillation with RVR [facility])   Patient Active Problem List    Diagnosis Date Noted    Atrial fibrillation with RVR ([facility] ) 2022    Acute on chronic congestive heart failure ([facility] ) 2022    Elevated INR 2022    HTN (hypertension) 2022    KEILA (acute kidney injury) ([facility] ) 2022      LOS (days): 1  Geometric Mean LOS (GMLOS) (days): 2 40  Days to GMLOS:1 7     OBJECTIVE:  Risk of Unplanned Readmission Score: 9 39   Current admission status: Inpatient    Preferred Pharmacy: No Pharmacies Listed  Primary Care Provider: No primary care provider on file  Primary Insurance: [employer]  Secondary Insurance:     DISCHARGE DETAILS:    Chart reviewed and case discussed during MDR's  Patient is being dc'd and no needs identified

## 2022-05-13 NOTE — ASSESSMENT & PLAN NOTE
Patient began with racing heart, vomiting beginning today  · Dx January at [place], on warfarin (unsure of dose, unable to verify)  · Per patient found to have thrombus on NATHALY, cardiac cath without extensive stenosis  · Nursing to obtain records  · Patient was given 20 milligram Cardizem bolus and was started on Cardizem drip and heart rate has improved    · Cardizem drip was discontinued   · Continue Lopressor 100 milligram p o  B i d   · INR was supratherapeutic for which Coumadin has been on hold  · Patient has a follow-up appointment with EP next week to discuss NATHALY with cardioversion  · Patient was discharged home as patient has  to attend tomorrow  · 2D echo showed slightly reduced systolic function with EF of 45% with mild-to-moderate MR and mild TR

## 2022-05-13 NOTE — ASSESSMENT & PLAN NOTE
Pt INR noted to be 6 3 on warfarin at home  · Hold warfarin  · No signs of active bleeding  · Recheck INR/PTT in AM

## 2022-05-13 NOTE — CONSULTS
Consultation - Cardiology   Marsha Saldana 74 y o  male MRN: [id number]  Unit/Bed#: [address] Encounter: [id number]    Assessment/Plan     Assessment:  1  Persistent atrial relation with rapid ventricular response  2  Acute kidney injury on chronic kidney disease  3  Nausea  4  Coagulopathy  5  Hypertension  6  ETOH use       Plan:  Patient has been admitted to the hospitalist service  1  Will discontinue Cardizem at this time and paced patient back on his Lopressor 100 mg b i d  2  Awaiting digoxin level    3  Encourage hydration and encourage alcohol cessation    4  Patient takes Coumadin 2 5 mg daily and follows with the [facility] with the [facility]  Would continue to hold Coumadin today and resume on Saturday 05/14/2022 with INR check on Monday 05/16/2022     5  Would reintroduce lisinopril due to patient's proteinuria and recommend follow-up with Nephrology in the outpatient setting    6  Cut patient's Lasix to once a day    7  Will obtain 2D echocardiogram to evaluate cardiac structure and function  History of Present Illness   Physician Requesting Consult: Matias Murphy MD  Reason for Consult / Principal Problem:  Persistent atrial fibrillation, coagulopathy      HPI: Marsha Saldana is a 74y o  year old male who presented to the emergency room for evaluation nausea, vomiting and also patient noted that he has been having blood-tinged sputum when he coughs up  Patient was noted to be in rapid atrial fibrillation and also appear to be mildly dehydrated  Patient's INR was noted to be 6 3  He has been admitted for rate control and further evaluation    Patient receives his care through the [facility] in [place]  He notes in January of 2022 he was admitted to the hospital there with COVID-19 and rapid atrial fibrillation  Notes since January they have had difficulty managing his heart rates    He has an appointment next week with   [facility] for EP evaluation  In January he underwent NATHALY for possible cardioversion and cardioversion was aborted due to 2 thrombus in the left atria he was placed on Coumadin, beta-blocker and digoxin and discharged  He states he also had a cardiac catheterization and was told he did not have any blockages  Unfortunately we do not have access to any of these records  He notes that he has not had a repeat NATHALY to re-evaluate the atrial thrombus  Patient notes that he has a history of alcohol use and after his diagnosis with AFib in January did quit drinking until recently over the last month he has resumed drinking alcohol which he states is secondary to stress and anxiety  He verbalizes that he is attempting to quit again  Notes increased levels of stress as his grandson  last week in a car accident and tomorrow is his      Patient was started on Cardizem in the emergency room and since arrival to the floor his heart rates have been controlled  He also notes that he has a history of CHF but does not remember what his ejection fraction is  High sensitivity troponins were 216, 276 and 335, TSH was normal, digoxin level is pending and INR today is 4 74  Patient also noted to have proteinuria      Inpatient consult to Cardiology  Consult performed by: SURY Flower  Consult ordered by: Mari Alejandre PA-C          Review of Systems   Constitutional: Negative  Negative for activity change and fatigue  HENT: Negative  Negative for congestion, facial swelling, rhinorrhea and tinnitus  Eyes: Negative  Negative for photophobia and visual disturbance  Respiratory: Negative  Negative for chest tightness, shortness of breath and wheezing  Cardiovascular: Positive for palpitations  Negative for chest pain  Gastrointestinal: Positive for nausea and vomiting  Negative for abdominal distention  Endocrine: Negative  Negative for polydipsia, polyphagia and polyuria     Genitourinary: Negative  Negative for difficulty urinating  Musculoskeletal: Negative  Negative for arthralgias and gait problem  Neurological: Negative  Negative for dizziness, syncope, weakness and light-headedness  Hematological: Negative  Historical Information   Past Medical History:   Diagnosis Date    Atrial fibrillation ([address] )     Hypertension      Past Surgical History:   Procedure Laterality Date    JOINT REPLACEMENT Right     knee     Social History     Substance and Sexual Activity   Alcohol Use Yes     Social History     Substance and Sexual Activity   Drug Use Never     E-Cigarette/Vaping    E-Cigarette Use Never User      E-Cigarette/Vaping Substances     Social History     Tobacco Use   Smoking Status Former Smoker   Smokeless Tobacco Not on file     Family History: History reviewed  No pertinent family history  Meds/Allergies   all current active meds have been reviewed, current meds:   Current Facility-Administered Medications   Medication Dose Route Frequency    acetaminophen (TYLENOL) tablet 650 mg  650 mg Oral Q6H PRN    aspirin chewable tablet 324 mg  324 mg Oral Once    furosemide (LASIX) injection 40 mg  40 mg Intravenous Daily    ondansetron (ZOFRAN) injection 4 mg  4 mg Intravenous Q6H PRN    and PTA meds:   Prior to Admission Medications   Prescriptions Last Dose Informant Patient Reported? Taking?   digoxin (LANOXIN) 0 125 mg tablet 5/12/2022 at Unknown time  Yes Yes   Sig: Take 125 mcg by mouth in the morning  furosemide (LASIX) 20 mg tablet 5/12/2022 at Unknown time  Yes Yes   Sig: Take 20 mg by mouth in the morning and 20 mg in the evening  lisinopril (ZESTRIL) 5 mg tablet 5/12/2022 at Unknown time  Yes Yes   Sig: Take 5 mg by mouth in the morning     metoprolol tartrate (LOPRESSOR) 100 mg tablet 5/12/2022 at Unknown time  Yes Yes   Sig: Take 100 mg by mouth every 12 (twelve) hours   warfarin (COUMADIN) 2 5 mg tablet 5/11/2022  Yes Yes   Sig: Take 2 5 mg by mouth in the evening  *Pt unsure of dose*  Facility-Administered Medications: None     Allergies   Allergen Reactions    Codeine Rash       Objective   Vitals: Blood pressure 127/86, pulse (!) 47, temperature 98 °F (36 7 °C), resp  rate 18, height 5' 8" (1 727 m), weight 79 2 kg (174 lb 9 7 oz), SpO2 94 %  Orthostatic Blood Pressures    Flowsheet Row Most Recent Value   Blood Pressure 127/86 filed at 05/13/2022 0747   Patient Position - Orthostatic VS Sitting filed at 05/12/2022 2100            Intake/Output Summary (Last 24 hours) at 5/13/2022 0835  Last data filed at 5/13/2022 0501  Gross per 24 hour   Intake 1000 ml   Output 500 ml   Net 500 ml       Invasive Devices  Report    Peripheral Intravenous Line  Duration           Peripheral IV 05/12/22 Left Antecubital <1 day    Peripheral IV 05/12/22 Right Forearm <1 day                Physical Exam  Vitals and nursing note reviewed  Constitutional:       General: He is not in acute distress  Appearance: Normal appearance  He is normal weight  HENT:      Right Ear: External ear normal       Left Ear: External ear normal       Nose: Nose normal    Eyes:      General: No scleral icterus  Right eye: No discharge  Left eye: No discharge  Cardiovascular:      Rate and Rhythm: Normal rate  Rhythm irregularly irregular  Pulses: Normal pulses  Heart sounds: Normal heart sounds  Pulmonary:      Effort: Pulmonary effort is normal  No accessory muscle usage or respiratory distress  Breath sounds: Examination of the right-lower field reveals decreased breath sounds  Examination of the left-lower field reveals decreased breath sounds  Decreased breath sounds present  Comments: Coarse breath sounds  Abdominal:      General: Bowel sounds are normal  There is no distension  Palpations: Abdomen is soft  Musculoskeletal:      Right lower leg: No edema  Left lower leg: No edema  Skin:     General: Skin is warm and dry  Capillary Refill: Capillary refill takes less than 2 seconds  Neurological:      General: No focal deficit present  Mental Status: He is alert and oriented to person, place, and time  Mental status is at baseline  Psychiatric:         Mood and Affect: Mood normal          Lab Results:   I have personally reviewed pertinent lab results  CBC with diff:   Results from last 7 days   Lab Units 05/13/22  0527   WBC Thousand/uL 15 82*   RBC Million/uL 4 39   HEMOGLOBIN g/dL 13 4   HEMATOCRIT % 40 8   MCV fL 93   MCH pg 30 5   MCHC g/dL 32 8   RDW % 14 8   MPV fL 10 6   PLATELETS Thousands/uL 413*     CMP:   Results from last 7 days   Lab Units 05/13/22  0527 05/12/22  1723   SODIUM mmol/L 137 140   CHLORIDE mmol/L 99* 99*   CO2 mmol/L 27 26   BUN mg/dL 22 18   CREATININE mg/dL 1 58* 1 70*   CALCIUM mg/dL 8 3 9 1   AST U/L  --  27   ALT U/L  --  32   ALK PHOS U/L  --  107   EGFR ml/min/1 73sq m 44 40     HS Troponin:   0   Lab Value Date/Time    HSTNI0 216 (H) 05/12/2022 1723    HSTNI2 272 (H) 05/12/2022 1937    HSTNI4 335 (H) 05/12/2022 2115     BNP:   Results from last 7 days   Lab Units 05/13/22  0527   POTASSIUM mmol/L 4 2   CHLORIDE mmol/L 99*   CO2 mmol/L 27   BUN mg/dL 22   CREATININE mg/dL 1 58*   CALCIUM mg/dL 8 3   EGFR ml/min/1 73sq m 44     Coags:   Results from last 7 days   Lab Units 05/13/22  0527   PTT seconds 51*   INR  4 74*     TSH:   pending  Magnesium:   Results from last 7 days   Lab Units 05/12/22  1723   MAGNESIUM mg/dL 1 6     Lipid Profile:     Imaging: I have personally reviewed pertinent reports      EKG:  Rapid atrial fibrillation at a rate of 133  VTE Prophylaxis: Sequential compression device Florence Kay     Code Status: Level 1 - Full Code  Advance Directive and Living Will:      Power of :    POLST:      Bennie Solis, [address] [facility]  Cardiology

## 2022-05-13 NOTE — ASSESSMENT & PLAN NOTE
Wt Readings from Last 3 Encounters:   05/13/22 78 9 kg (174 lb)   Pt with increasing SOB on 2L oxygen, orthopnea  · Patient on lasix 20mg at home, unable to verify dose with pharmacy  · CT scan shows bilateral symmetric perihilar groundglass opacities and small bilateral effusions likely on the basis of pulmonary edema/CHF  Patient was given Lasix 40 milligrams  IV during hospitalization  No clinical evidence of significant fluid overload as patient is able to lay flat without any symptoms  Lasix dose was decreased to 20 milligram p o   Daily as per Cardiology recommendations

## 2022-05-13 NOTE — DISCHARGE INSTRUCTIONS
Hold Coumadin on May 14, 2022 and May 15, 2022  Get PT/INR on May 16th    Call PCP/Cardiology regarding further dosing of Coumadin

## 2022-05-13 NOTE — ASSESSMENT & PLAN NOTE
Patient began with racing heart, vomiting beginning today  · Dx January at [address], on warfarin (unsure of dose, unable to verify)  · Per patient found to have thrombus on NATHALY, cardiac cath without extensive stenosis  · Nursing to obtain records  · Give cardizem 20mg bolus, cardizem drip  · Continue cardizem drip 12 5mg  · Optimize electrolytes  · Monitor on tele  · Cardiology consult

## 2022-05-13 NOTE — ED PROVIDER NOTES
History  Chief Complaint   Patient presents with    Chest Pain     Patient c/o rapid heart rate, nausea, feels like passing out, cough   Rapid Heart Rate    Cough     Patient presents for evaluation of rapid heart rate associated with nausea and nonproductive cough  Patient states he felt lightheaded like he was going to pass out as well  He is having some chest discomfort as well  Patient recently had Adina James is mid to the [facility] also diagnosed with AFib at that time  Denies any fever chills  History provided by:  Patient   used: No    Chest Pain  Associated symptoms: cough, fatigue and palpitations    Associated symptoms: no abdominal pain, no back pain, no fever, no headache, no numbness, no shortness of breath, not vomiting and no weakness    Rapid Heart Rate  Associated symptoms: chest pain and cough    Associated symptoms: no back pain, no numbness, no shortness of breath, no vomiting and no weakness    Cough  Associated symptoms: chest pain    Associated symptoms: no chills, no ear pain, no fever, no headaches, no rash, no shortness of breath and no sore throat        Prior to Admission Medications   Prescriptions Last Dose Informant Patient Reported? Taking?   digoxin (LANOXIN) 0 125 mg tablet 5/12/2022 at Unknown time  Yes Yes   Sig: Take 125 mcg by mouth in the morning  furosemide (LASIX) 20 mg tablet 5/12/2022 at Unknown time  Yes Yes   Sig: Take 20 mg by mouth in the morning and 20 mg in the evening  lisinopril (ZESTRIL) 5 mg tablet 5/12/2022 at Unknown time  Yes Yes   Sig: Take 5 mg by mouth in the morning  metoprolol tartrate (LOPRESSOR) 100 mg tablet 5/12/2022 at Unknown time  Yes Yes   Sig: Take 100 mg by mouth every 12 (twelve) hours   warfarin (COUMADIN) 2 5 mg tablet 5/11/2022  Yes Yes   Sig: Take 2 5 mg by mouth in the evening  *Pt unsure of dose*        Facility-Administered Medications: None       Past Medical History:   Diagnosis Date    Atrial fibrillation ([facility] [address] )     Hypertension        Past Surgical History:   Procedure Laterality Date    JOINT REPLACEMENT Right     knee       History reviewed  No pertinent family history  I have reviewed and agree with the history as documented  E-Cigarette/Vaping    E-Cigarette Use Never User      E-Cigarette/Vaping Substances     Social History     Tobacco Use    Smoking status: Former Smoker   Vaping Use    Vaping Use: Never used   Substance Use Topics    Alcohol use: Yes    Drug use: Never       Review of Systems   Constitutional: Positive for fatigue  Negative for chills and fever  HENT: Negative for ear pain and sore throat  Eyes: Negative for pain and visual disturbance  Respiratory: Positive for cough  Negative for shortness of breath  Cardiovascular: Positive for chest pain and palpitations  Gastrointestinal: Negative for abdominal pain and vomiting  Genitourinary: Negative for dysuria and hematuria  Musculoskeletal: Negative for arthralgias and back pain  Skin: Negative for color change and rash  Neurological: Positive for light-headedness  Negative for seizures, syncope, weakness, numbness and headaches  All other systems reviewed and are negative  Physical Exam  Physical Exam  Vitals and nursing note reviewed  Constitutional:       General: He is not in acute distress  HENT:      Head: Atraumatic  Right Ear: External ear normal       Left Ear: External ear normal       Nose: Nose normal       Mouth/Throat:      Mouth: Mucous membranes are moist       Pharynx: Oropharynx is clear  Eyes:      General: No scleral icterus  Conjunctiva/sclera: Conjunctivae normal    Cardiovascular:      Rate and Rhythm: Tachycardia present  Rhythm irregular  Pulses: Normal pulses  Pulmonary:      Effort: Pulmonary effort is normal  No respiratory distress  Breath sounds: Normal breath sounds  Abdominal:      General: Abdomen is flat   Bowel sounds are normal  There is no distension  Palpations: Abdomen is soft  Tenderness: There is no abdominal tenderness  There is no guarding or rebound  Musculoskeletal:         General: No deformity  Normal range of motion  Skin:     Capillary Refill: Capillary refill takes less than 2 seconds  Findings: No rash  Neurological:      General: No focal deficit present  Mental Status: He is alert and oriented to person, place, and time           Vital Signs  ED Triage Vitals [05/12/22 1637]   Temperature Pulse Respirations Blood Pressure SpO2   (!) 97 1 °F (36 2 °C) (!) 140 (!) 24 (!) 163/110 95 %      Temp Source Heart Rate Source Patient Position - Orthostatic VS BP Location FiO2 (%)   Tympanic Monitor Sitting Left arm --      Pain Score       7           Vitals:    05/12/22 2358 05/13/22 0000 05/13/22 0747 05/13/22 1145   BP: 125/73 125/73 127/86 129/85   Pulse: 62 55 (!) 47 75   Patient Position - Orthostatic VS:             Visual Acuity      ED Medications  Medications   aspirin chewable tablet 324 mg (324 mg Oral Not Given 5/12/22 1825)   acetaminophen (TYLENOL) tablet 650 mg (has no administration in time range)   ondansetron (ZOFRAN) injection 4 mg (4 mg Intravenous Given 5/12/22 2117)   metoprolol tartrate (LOPRESSOR) tablet 100 mg (100 mg Oral Given 5/13/22 0907)   sodium chloride 0 9 % bolus 1,000 mL (0 mL Intravenous Stopped 5/12/22 1900)   ondansetron (ZOFRAN) injection 4 mg (4 mg Intravenous Given 5/12/22 1724)   diltiazem (CARDIZEM) injection 20 mg (20 mg Intravenous Given 5/12/22 1724)   diltiazem (CARDIZEM) 125 mg in sodium chloride 0 9 % 125 mL infusion (12 5 mg/hr Intravenous Rate/Dose Change 5/12/22 1945)   magnesium sulfate 2 g/50 mL IVPB (premix) 2 g (2 g Intravenous New Bag 5/12/22 2251)       Diagnostic Studies  Results Reviewed     Procedure Component Value Units Date/Time    UA (URINE) with reflex to Scope [[id number]]  (Abnormal) Collected: 05/12/22 2228    Lab Status: Final result Specimen: Urine, Clean Catch Updated: 05/12/22 2235     Color, UA Yellow     Clarity, UA Clear     Specific Gravity, UA >=1 030     pH, UA 5 5     Leukocytes, UA Negative     Nitrite, UA Negative     Protein,  (2+) mg/dl      Glucose, UA Negative mg/dl      Ketones, UA 15 (1+) mg/dl      Urobilinogen, UA 0 2 E U /dl      Bilirubin, UA Interference- unable to analyze     Blood, UA Trace-Intact    HS Troponin I 4hr [[id number]]  (Abnormal) Collected: 05/12/22 2115    Lab Status: Final result Specimen: Blood from Arm, Right Updated: 05/12/22 2144     hs TnI 4hr 335 ng/L      Delta 4hr hsTnI 119 ng/L     COVID/FLU/RSV [[id number]]  (Normal) Collected: 05/12/22 2053    Lab Status: Final result Specimen: Nares from Nasopharyngeal Swab Updated: 05/12/22 2142     SARS-CoV-2 Negative     INFLUENZA A PCR Negative     INFLUENZA B PCR Negative     RSV PCR Negative    Narrative:      FOR PEDIATRIC PATIENTS - copy/paste COVID Guidelines URL to browser: [email or web address]  [employer]    SARS-CoV-2 assay is a Nucleic Acid Amplification assay intended for the  qualitative detection of nucleic acid from SARS-CoV-2 in nasopharyngeal  swabs  Results are for the presumptive identification of SARS-CoV-2 RNA  Positive results are indicative of infection with SARS-CoV-2, the virus  causing COVID-19, but do not rule out bacterial infection or co-infection  with other viruses  Laboratories within the [place] and its  territories are required to report all positive results to the appropriate  public health authorities  Negative results do not preclude SARS-CoV-2  infection and should not be used as the sole basis for treatment or other  patient management decisions  Negative results must be combined with  clinical observations, patient history, and epidemiological information  This test has not been FDA cleared or approved      This test has been authorized by FDA under an Emergency Use Authorization  (EUA)  This test is only authorized for the duration of time the  declaration that circumstances exist justifying the authorization of the  emergency use of an in vitro diagnostic tests for detection of SARS-CoV-2  virus and/or diagnosis of COVID-19 infection under section 564(b)(1) of  the Act, 21 U  S C  370TOG-0(J)(5), unless the authorization is terminated  or revoked sooner  The test has been validated but independent review by FDA  and CLIA is pending  Test performed using [employer] Gene[employer]pert: This RT-PCR assay targets N2,  a region unique to SARS-CoV-2  A conserved region in the E-gene was chosen  for pan-Sarbecovirus detection which includes SARS-CoV-2      HS Troponin I 2hr [[id number]]  (Abnormal) Collected: 05/12/22 1937    Lab Status: Final result Specimen: Blood from Arm, Right Updated: 05/12/22 2005     hs TnI 2hr 272 ng/L      Delta 2hr hsTnI 56 ng/L     APTT [[id number]]  (Abnormal) Collected: 05/12/22 1808    Lab Status: Final result Specimen: Blood from Arm, Right Updated: 05/12/22 1825     PTT 55 seconds     Protime-INR [[id number]]  (Abnormal) Collected: 05/12/22 1808    Lab Status: Final result Specimen: Blood from Arm, Right Updated: 05/12/22 1825     Protime 53 0 seconds      INR 6 30    HS Troponin 0hr (reflex protocol) [[id number]]  (Abnormal) Collected: 05/12/22 1723    Lab Status: Final result Specimen: Blood from Arm, Left Updated: 05/12/22 1758     hs TnI 0hr 216 ng/L     Comprehensive metabolic panel [[id number]]  (Abnormal) Collected: 05/12/22 1723    Lab Status: Final result Specimen: Blood from Arm, Left Updated: 05/12/22 1750     Sodium 140 mmol/L      Potassium 4 5 mmol/L      Chloride 99 mmol/L      CO2 26 mmol/L      ANION GAP 15 mmol/L      BUN 18 mg/dL      Creatinine 1 70 mg/dL      Glucose 191 mg/dL      Calcium 9 1 mg/dL      AST 27 U/L      ALT 32 U/L      Alkaline Phosphatase 107 U/L      Total Protein 7 6 g/dL      Albumin 3 6 g/dL      Total Bilirubin 0 69 mg/dL eGFR 40 ml/min/1 73sq m     Narrative:      [place] guidelines for Chronic Kidney Disease (CKD):     Stage 1 with normal or high GFR (GFR > 90 mL/min/1 73 square meters)    Stage 2 Mild CKD (GFR = 60-89 mL/min/1 73 square meters)    Stage 3A Moderate CKD (GFR = 45-59 mL/min/1 73 square meters)    Stage 3B Moderate CKD (GFR = 30-44 mL/min/1 73 square meters)    Stage 4 Severe CKD (GFR = 15-29 mL/min/1 73 square meters)    Stage 5 End Stage CKD (GFR <15 mL/min/1 73 square meters)  Note: GFR calculation is accurate only with a steady state creatinine    Magnesium [[id number]]  (Normal) Collected: 05/12/22 1723    Lab Status: Final result Specimen: Blood from Arm, Left Updated: 05/12/22 1750     Magnesium 1 6 mg/dL     CBC and differential [[id number]]  (Abnormal) Collected: 05/12/22 1723    Lab Status: Final result Specimen: Blood from Arm, Left Updated: 05/12/22 1735     WBC 15 52 Thousand/uL      RBC 5 25 Million/uL      Hemoglobin 15 7 g/dL      Hematocrit 48 5 %      MCV 92 fL      MCH 29 9 pg      MCHC 32 4 g/dL      RDW 14 7 %      MPV 11 2 fL      Platelets 070 Thousands/uL      nRBC 1 /100 WBCs      Neutrophils Relative 86 %      Immat GRANS % 1 %      Lymphocytes Relative 10 %      Monocytes Relative 3 %      Eosinophils Relative 0 %      Basophils Relative 0 %      Neutrophils Absolute 13 26 Thousands/µL      Immature Grans Absolute 0 12 Thousand/uL      Lymphocytes Absolute 1 55 Thousands/µL      Monocytes Absolute 0 53 Thousand/µL      Eosinophils Absolute 0 01 Thousand/µL      Basophils Absolute 0 05 Thousands/µL                  CT chest abdomen pelvis wo contrast   Final Result by Chloé Candelario MD (05/12 1906)      Bilateral symmetric perihilar groundglass opacities and small bilateral effusions likely on the basis of pulmonary edema/CHF  No acute inflammatory process identified in the abdomen or pelvis  Colonic diverticulosis without diverticulitis  Workstation performed: [id number]         XR chest 1 view portable   Final Result by Sharron Timmons MD (11/18 [id number])      No acute cardiopulmonary disease  Workstation performed: [id number]                    Procedures  CriticalCare Time  Performed by: Selene Constantino DO  Authorized by: Selene Constantino DO     Critical care provider statement:     Critical care time (minutes):  35    Critical care time was exclusive of:  Separately billable procedures and treating other patients    Critical care was necessary to treat or prevent imminent or life-threatening deterioration of the following conditions: afib with RVR  Critical care was time spent personally by me on the following activities:  Blood draw for specimens, development of treatment plan with patient or surrogate, obtaining history from patient or surrogate, discussions with consultants, evaluation of patient's response to treatment, examination of patient, review of old charts, re-evaluation of patient's condition, ordering and review of radiographic studies, ordering and review of laboratory studies, ordering and performing treatments and interventions and interpretation of cardiac output measurements             ED Course             HEART Risk Score    Flowsheet Row Most Recent Value   Heart Score Risk Calculator    History 1 Filed at: 05/12/2022 1924   ECG 1 Filed at: 05/12/2022 1924   Age 2 Filed at: 05/12/2022 1924   Risk Factors 2 Filed at: 05/12/2022 1924   Troponin 2 Filed at: 05/12/2022 1924   HEART Score 8 Filed at: 05/12/2022 1924                        SBIRT 22yo+    Flowsheet Row Most Recent Value   SBIRT (23 yo +)    In order to provide better care to our patients, we are screening all of our patients for alcohol and drug use  Would it be okay to ask you these screening questions?  No Filed at: 05/12/2022 1702                    MDM  Number of Diagnoses or Management Options  Atrial fibrillation with rapid ventricular response ([facility] [address] )  Diagnosis management comments: Pulse ox 93% on room air indicating adequate oxygenation  CXR: NAD as read by me       Amount and/or Complexity of Data Reviewed  Clinical lab tests: ordered and reviewed  Tests in the radiology section of CPT®: ordered and reviewed  Decide to obtain previous medical records or to obtain history from someone other than the patient: yes  Review and summarize past medical records: yes  Independent visualization of images, tracings, or specimens: yes    Patient Progress  Patient progress: stable      Disposition  Final diagnoses:   Atrial fibrillation with rapid ventricular response ([facility] [address] )     Time reflects when diagnosis was documented in both MDM as applicable and the Disposition within this note     Time User Action Codes Description Comment    5/12/2022  7:23 PM Calleen La Add [I48 91] Atrial fibrillation with rapid ventricular response ([facility] [address] )     5/13/2022 12:31 PM Elsie Shrestha Add [I48 91] Atrial fibrillation with RVR ([facility] [address] )     5/13/2022 12:31 PM Elsie Shrestha Add [I50 9] Acute on chronic congestive heart failure, unspecified heart failure type ([facility] [address] )     5/13/2022 12:31 PM Elsie Shrestha Add [N17 9] KEILA (acute kidney injury) [facility])       ED Disposition     ED Disposition   Admit    Condition   Stable    Date/Time   Thu May 12, 2022  7:24 PM    Comment   Case was discussed with MADDY Sawant and the patient's admission status was agreed to be Admission Status: inpatient status to the service of Dr Maurer Morning  Follow-up Information    None         Current Discharge Medication List      CONTINUE these medications which have CHANGED    Details   digoxin (LANOXIN) 0 125 mg tablet Take 1 tablet (125 mcg total) by mouth every other day  Refills: 0    Associated Diagnoses: Atrial fibrillation with RVR (HCC)      furosemide (LASIX) 20 mg tablet Take 1 tablet (20 mg total) by mouth in the morning    Refills: 0    Associated Diagnoses: Acute on chronic congestive heart failure, unspecified heart failure type ([facility] [address] )         CONTINUE these medications which have NOT CHANGED    Details   lisinopril (ZESTRIL) 5 mg tablet Take 5 mg by mouth in the morning  metoprolol tartrate (LOPRESSOR) 100 mg tablet Take 100 mg by mouth every 12 (twelve) hours         STOP taking these medications       warfarin (COUMADIN) 2 5 mg tablet Comments:   Reason for Stopping:               Outpatient Discharge Orders   Protime-INR   Standing Status: Future Standing Exp  Date: 05/13/23     Basic metabolic panel   Standing Status: Future Standing Exp  Date: 05/13/23     CBC   Standing Status: Future Standing Exp   Date: 05/14/23     Discharge Diet     Activity as tolerated       PDMP Review     None          ED Provider  Electronically Signed by           Romi Franklin DO  05/13/22 8417

## 2022-05-13 NOTE — PLAN OF CARE
Problem: PAIN - ADULT  Goal: Verbalizes/displays adequate comfort level or baseline comfort level  Description: Interventions:  - Encourage patient to monitor pain and request assistance  - Assess pain using appropriate pain scale  - Administer analgesics based on type and severity of pain and evaluate response  - Implement non-pharmacological measures as appropriate and evaluate response  - Consider cultural and social influences on pain and pain management  - Notify physician/advanced practitioner if interventions unsuccessful or patient reports new pain  Outcome: Progressing     Problem: SAFETY ADULT  Goal: Patient will remain free of falls  Description: INTERVENTIONS:  - Educate patient/family on patient safety including physical limitations  - Instruct patient to call for assistance with activity   - Consult OT/PT to assist with strengthening/mobility   - Keep Call bell within reach  - Keep bed low and locked with side rails adjusted as appropriate  - Keep care items and personal belongings within reach  - Initiate and maintain comfort rounds  - Make Fall Risk Sign visible to staff  - Offer Toileting every 2 Hours, in advance of need  - Initiate/Maintain Bed alarm  - Obtain necessary fall risk management equipment: as required   - Apply yellow socks and bracelet for high fall risk patients  - Consider moving patient to room near nurses station  Outcome: Progressing     Problem: CARDIOVASCULAR - ADULT  Goal: Maintains optimal cardiac output and hemodynamic stability  Description: INTERVENTIONS:  - Monitor I/O, vital signs and rhythm  - Monitor for S/S and trends of decreased cardiac output  - Administer and titrate ordered vasoactive medications to optimize hemodynamic stability  - Assess quality of pulses, skin color and temperature  - Assess for signs of decreased coronary artery perfusion  - Instruct patient to report change in severity of symptoms  Outcome: Progressing  Goal: Absence of cardiac dysrhythmias or at baseline rhythm  Description: INTERVENTIONS:  - Continuous cardiac monitoring, vital signs, obtain 12 lead EKG if ordered  - Administer antiarrhythmic and heart rate control medications as ordered  - Monitor electrolytes and administer replacement therapy as ordered  Outcome: Progressing     Problem: RESPIRATORY - ADULT  Goal: Achieves optimal ventilation and oxygenation  Description: INTERVENTIONS:  - Assess for changes in respiratory status  - Assess for changes in mentation and behavior  - Position to facilitate oxygenation and minimize respiratory effort  - Oxygen administered by appropriate delivery if ordered  - Initiate smoking cessation education as indicated  - Encourage broncho-pulmonary hygiene including cough, deep breathe, Incentive Spirometry  - Assess the need for suctioning and aspirate as needed  - Assess and instruct to report SOB or any respiratory difficulty  - Respiratory Therapy support as indicated  Outcome: Progressing

## 2022-05-24 ENCOUNTER — TELEPHONE (OUTPATIENT)
Dept: INPATIENT UNIT | Facility: HOSPITAL | Age: 67
End: 2022-05-24